# Patient Record
Sex: FEMALE | Race: WHITE
[De-identification: names, ages, dates, MRNs, and addresses within clinical notes are randomized per-mention and may not be internally consistent; named-entity substitution may affect disease eponyms.]

---

## 2017-04-28 ENCOUNTER — HOSPITAL ENCOUNTER (OUTPATIENT)
Dept: HOSPITAL 56 - MW.CHOBGYN | Age: 27
End: 2017-04-28
Attending: OBSTETRICS & GYNECOLOGY
Payer: COMMERCIAL

## 2017-04-28 DIAGNOSIS — Z32.00: Primary | ICD-10-CM

## 2019-06-17 ENCOUNTER — HOSPITAL ENCOUNTER (INPATIENT)
Dept: HOSPITAL 56 - MW.OB | Age: 29
LOS: 2 days | Discharge: HOME | DRG: 560 | End: 2019-06-19
Attending: OBSTETRICS & GYNECOLOGY | Admitting: OBSTETRICS & GYNECOLOGY
Payer: COMMERCIAL

## 2019-06-17 DIAGNOSIS — Z3A.39: ICD-10-CM

## 2019-06-17 NOTE — PCM.PREANE
Preanesthetic Assessment





- Procedure


Proposed Procedure: 





labor epidural





- Anesthesia/Transfusion/Family Hx


Anesthesia History: Prior Anesthesia Without Reaction


Family History of Anesthesia Reaction: No


Transfusion History: No Prior Transfusion(s)





- Review of Systems


General: No Symptoms


Pulmonary: No Symptoms


Cardiovascular: No Symptoms


Gastrointestinal: No Symptoms


Neurological: No Symptoms


Other: Reports: None





- Physical Assessment


NPO Status Date: 19


NPO Status Time: 16:00


Height: 1.7 m


Weight: 106.594 kg


ASA Class: 2


Mental Status: Alert & Oriented x3


Airway Class: Mallampati = 1


Dentition: Reports: Normal Dentition


Thyro-Mental Finger Breadths: 3


ROM/Head Extension: Full


Lungs: Clear to Auscultation


Cardiovascular: Regular Rate





- Lab


Values: 





 Laboratory Last Values











WBC  13.10 K/uL (4.0-11.0)  H  19  19:33    


 


RBC  4.35 M/uL (4.30-5.90)   19  19:33    


 


Hgb  13.4 g/dL (12.0-16.0)   19  19:33    


 


Hct  40.5 % (36.0-46.0)   19  19:33    


 


MCV  93.1 fL (80.0-98.0)   19  19:33    


 


MCH  30.8 pg (27.0-32.0)   19  19:33    


 


MCHC  33.1 g/dL (31.0-37.0)   19  19:33    


 


RDW Std Deviation  46.5 fl (28.0-62.0)   19  19:33    


 


RDW Coeff of Joseph  14 % (11.0-15.0)   19  19:33    


 


Plt Count  191 K/uL (150-400)   19  19:33    


 


MPV  10.30 fL (7.40-12.00)   19  19:33    


 


Nucleated RBC %  0.0 /100WBC  19  19:33    


 


Nucleated RBCs #  0 K/uL  19  19:33    


 


Blood Type  A POSITIVE   19  19:33    


 


Antibody Screen  NEGATIVE   19  19:33    














- Allergies


Allergies/Adverse Reactions: 


 Allergies











Allergy/AdvReac Type Severity Reaction Status Date / Time


 


No Known Allergies Allergy   Verified 19 18:25














- Blood


Blood Available: No


Product(s) Available: None





- Anesthesia Plan


Pre-Op Medication Ordered: None





- Acknowledgements


Anesthesia Type Planned: Epidural


Pt an Appropriate Candidate for the Planned Anesthesia: Yes


Alternatives and Risks of Anesthesia Discussed w Pt/Guardian: Yes


Pt/Guardian Understands and Agrees with Anesthesia Plan: Yes





PreAnesthesia Questionnaire





- Past Health History


Medical/Surgical History: Denies Medical/Surgical History


Cardiovascular History: Reports: None


Respiratory History: Reports: None


Gastrointestinal History: Reports: None


OB/GYN History: Reports: Pregnancy


: 3


Para: 1


LMP (Approximate): Pregnant





- Past Surgical History


HEENT Surgical History: Reports: None


Cardiovascular Surgical History: Reports: None


Respiratory Surgical History: Reports: None


GI Surgical History: Reports: Cholecystectomy


Female  Surgical History: Reports: None


Endocrine Surgical History: Reports: None


Neurological Surgical History: Reports: None


Musculoskeletal Surgical History: Reports: None


Oncologic Surgical History: Reports: None


Dermatological Surgical History: Reports: None





- HOME MEDS


Home Medications: 


 Home Meds





. [No Known Home Meds]  16 [History]











- CURRENT (IN HOUSE) MEDS


Current Meds: 





 Current Medications





Butorphanol Tartrate (Stadol)  1 mg IVPUSH Q1H PRN


   PRN Reason: Pain


Carboprost Tromethamine (Hemabate Ds)  250 mcg IM ASDIRECTED PRN


   PRN Reason: Post Partum Hemorrhage


Tranexamic Acid 1,000 mg/ (Sodium Chloride)  110 mls @ 660 mls/hr IV ONETIME PRN


   PRN Reason: Bleeding


Lactated Ringer's (Ringers, Lactated)  1,000 mls @ 150 mls/hr IV ASDIRECTED [facility]


   Last Admin: 19 21:04 Dose:  999 mls/hr


Oxytocin/Sodium Chloride (Oxytocin 30 Unit/500 Ml-Ns)  30 unit in 500 mls @ 999 

mls/hr IV TITRATE [facility]


Lidocaine HCl (Xylocaine 1%)  50 ml INJECT ONETIME PRN


   PRN Reason: Laceration repair


Methylergonovine Maleate (Methergine)  0.2 mg IM ASDIRECTED PRN


   PRN Reason: Post Partum Hemorrhage


Misoprostol (Cytotec)  200 mcg PO ONETIME PRN


   PRN Reason: Post Partum Hemorrhage


Nalbuphine HCl (Nubain)  10 mg IVPUSH Q1H PRN


   PRN Reason: Pain (severe 7-10)


Sodium Chloride (Saline Flush)  10 ml FLUSH ASDIRECTED PRN


   PRN Reason: Keep Vein Open


Sodium Chloride (Saline Flush)  2.5 ml FLUSH ASDIRECTED PRN


   PRN Reason: Keep Vein Open


Sodium Chloride (Normal Saline)  10 ml IV ASDIRECTED PRN


   PRN Reason: IV Use


Sterile Water (Sterile Water For Irrigation)  1,000 ml IRR ASDIRECTED PRN


   PRN Reason: delivery





Discontinued Medications





Bupivacaine HCl (Sensorcaine-Mpf 0.25%) Confirm Administered Dose 10 ml .ROUTE 

.STK-MED ONE


   Stop: 19 19:53


Ephedrine Sulfate (Ephedrine Sulfate) Confirm Administered Dose 50 mg .ROUTE 

.STK-MED ONE


   Stop: 19 19:53


Fentanyl (Sublimaze) Confirm Administered Dose 100 mcg .ROUTE .STK-MED ONE


   Stop: 19 20:42


Fentanyl/Bupivacaine HCl (Fentanyl-Bupiv-Ns 2 Mcg/Ml-0.125%) Confirm 

Administered Dose 100 mls @ as directed .ROUTE .STK-MED ONE


   Stop: 19 19:52


Lidocaine/Epinephrine (Lidocaine 1.5%-Epi 1:200,000) Confirm Administered Dose 

5 ml IJ .STK-MED ONE


   Stop: 19 19:53

## 2019-06-17 NOTE — PCM.LDHP
L&D History of Present Illness





- General


Date of Service: 19


Admit Problem/Dx: 


 Patient Status Order with Admit Dx/Problem





19 16:07


Patient Status [ADT] Routine 





19 19:06


Patient Status [ADT] Routine 








 Admission Diagnosis/Problem











Admission Diagnosis/Problem    Pregnancy














19 19:43


27yo  EDC 2019 39 4/7wks A+, RI, GBS neg. Active labor


Source of Information: Patient


History Limitations: Reports: No Limitations





- History of Present Illness


Timing/Duration: Reports: minutes:


Location, Pregnancy: Reports: Abdomen


Quality: Reports: Ache, Stabbing


Severity: Moderate


Improves with: Reports: None


Worsens with: Reports: None


Associated Symptoms: Reports: N





- Related Data


Allergies/Adverse Reactions: 


 Allergies











Allergy/AdvReac Type Severity Reaction Status Date / Time


 


No Known Allergies Allergy   Verified 19 18:25











Home Medications: 


 Home Meds





. [No Known Home Meds]  16 [History]











Past Medical History


HEENT History: Reports: None


Cardiovascular History: Reports: None


Genitourinary History: Reports: None


OB/GYN History: Reports: Pregnancy


Musculoskeletal History: Reports: None


Neurological History: Reports: None


Psychiatric History: Reports: None


Endocrine/Metabolic History: Reports: None


Hematologic History: Reports: None


Immunologic History: Reports: None


Oncologic (Cancer) History: Reports: None


Dermatologic History: Reports: None





- Infectious Disease History


Infectious Disease History: Reports: None





- Past Surgical History


Head Surgeries/Procedures: Reports: None


HEENT Surgical History: Reports: None


Cardiovascular Surgical History: Reports: None


Respiratory Surgical History: Reports: None


GI Surgical History: Reports: Cholecystectomy


Female  Surgical History: Reports: None


Neurological Surgical History: Reports: None


Musculoskeletal Surgical History: Reports: None


Oncologic Surgical History: Reports: None





Social & Family History





- Family History


HEENT: Reports: None


Cardiac: Reports: None


GI: Reports: Pancreatitis


OBGYN: Reports: Pregnancy





H&P Review of Systems





- Review of Systems:


Review Of Systems: See Below


General: Reports: No Symptoms


HEENT: Reports: No Symptoms


Pulmonary: Reports: No Symptoms


Cardiovascular: Reports: No Symptoms


Gastrointestinal: Reports: No Symptoms


Genitourinary: Reports: No Symptoms


Musculoskeletal: Reports: No Symptoms


Skin: Reports: No Symptoms


Psychiatric: Reports: No Symptoms


Neurological: Reports: No Symptoms


Hematologic/Lymphatic: Reports: No Symptoms


Immunologic: Reports: No Symptoms





L&D Exam





- Exam


Exam: See Below





- Vital Signs


Weight: 106.594 kg





- OB Specific


Contraction Intensity: Moderate to Strong


Fetal Movement: Active


Fetal Heart Tones: Present


Fetal Heart Tones per Min: 145


Fetal Heart Rate (FHR) Variability: Moderate (6-25 bmp)


Birth Presentation: Vertex





- Mora Score


Mora Score Cervix Position: Anterior


Mora Score Consistency: Soft


Mora Score Effacement: >80%


Mora Score Dilation: > 5 cm


Mora Score Infant's Station: -2


Mora Score Total: 11





- Exam


General: Alert, Oriented, Cooperative


HEENT: Hearing Intact


Lungs: Normal Respiratory Effort


GI/Abdominal Exam: Soft, Non-Tender


Rectal Exam: Deferred


Genitourinary: Normal external exam, Cervical dilitation.  No: Cervical fluid, 

Vaginal bleeding


Back Exam: Normal Inspection, Full Range of Motion


Extremities: Normal Inspection, Normal Range of Motion, Non-Tender, No Pedal 

Edema


Skin: Warm, Dry, Intact


Neurological: Cranial Nerves Intact, Strength Equal Bilateral, Normal Gait, 

Normal Speech, Normal Tone, Sensation Intact


Psychiatric: Alert, Normal Affect, Normal Mood





- Problem List


(1) Supervision of normal IUP (intrauterine pregnancy) in multigravida


SNOMED Code(s): 027139629, 780608906, 491411540


   ICD Code: Z34.80 - ENCOUNTER FOR SUPRVSN OF NORMAL PREGNANCY, UNSP TRIMESTER

   Status: Acute   Priority: High   Current Visit: Yes   


Qualifiers: 


   Trimester: third trimester   Qualified Code(s): Z34.83 - Encounter for 

supervision of other normal pregnancy, third trimester   


Problem List Initiated/Reviewed/Updated: Yes


Orders Last 24hrs: 


 Active Orders 24 hr











 Category Date Time Status


 


 Patient Status [ADT] Routine ADT  19 19:06 Active


 


 Fetal Heart Tones [RC] CONTINUOUS Care  19 19:06 Active


 


 Fetal Non Stress Test [RC] PER UNIT ROUTINE Care  19 16:07 Active


 


 May Shower [RC] ASDIRECTED Care  19 19:06 Active


 


 Notify Provider [RC] PRN Care  19 19:06 Active


 


 Up ad Maddy [RC] ASDIRECTED Care  19 16:07 Active


 


 Vaginal Exam [RC] Click to Edit Care  19 16:07 Active


 


 Vital Signs [RC] PER UNIT ROUTINE Care  19 16:07 Active


 


 CBC W/O DIFF,HEMOGRAM [HEME] Routine Lab  19 19:33 Received


 


 TYPE AND SCREEN [BBK] Routine Lab  19 19:33 Received


 


 Butorphanol [Stadol] Med  19 19:05 Active





 1 mg IVPUSH Q1H PRN   


 


 Carboprost Tromethamine [Hemabate DS] Med  19 19:05 Active





 250 mcg IM ASDIRECTED PRN   


 


 Lactated Ringers [Ringers, Lactated] 1,000 ml Med  19 19:15 Active





 IV ASDIRECTED   


 


 Lidocaine 1% [Xylocaine 1%] Med  19 19:05 Active





 50 ml INJECT ONETIME PRN   


 


 Methylergonovine [Methergine] Med  19 19:05 Active





 0.2 mg IM ASDIRECTED PRN   


 


 Nalbuphine [Nubain] Med  19 19:05 Active





 10 mg IVPUSH Q1H PRN   


 


 Oxytocin/0.9 % Sodium Chloride [Oxytocin 30 Unit/500 ML Med  19 19:15 

Active





 -NS]   





 30 unit in 500 ml IV TITRATE   


 


 Sodium Chloride 0.9% [Normal Saline] Med  19 19:05 Active





 10 ml IV ASDIRECTED PRN   


 


 Sodium Chloride 0.9% [Saline Flush] Med  19 19:05 Active





 10 ml FLUSH ASDIRECTED PRN   


 


 Sodium Chloride 0.9% [Saline Flush] Med  19 19:05 Active





 2.5 ml FLUSH ASDIRECTED PRN   


 


 Tranexamic Acid [Cyklokapron] 1,000 mg Med  19 19:05 Active





 Sodium Chloride 0.9% [Normal Saline] 100 ml   





 IV ONETIME   


 


 Water For Irrigation,Sterile [Sterile Water for Med  19 19:05 Active





 Irrigation]   





 1,000 ml IRR ASDIRECTED PRN   


 


 miSOPROStol [Cytotec] Med  19 19:05 Active





 200 mcg PO ONETIME PRN   


 


 Fetal Scalp Electrode [WOMSER] Per Unit Routine Oth  19 19:06 Ordered


 


 Peripheral IV Insertion Adult [OM.PC] Routine Oth  19 19:06 Ordered


 


 Resuscitation Status Routine Resus Stat  19 16:07 Ordered








 Medication Orders





Butorphanol Tartrate (Stadol)  1 mg IVPUSH Q1H PRN


   PRN Reason: Pain


Carboprost Tromethamine (Hemabate Ds)  250 mcg IM ASDIRECTED PRN


   PRN Reason: Post Partum Hemorrhage


Tranexamic Acid 1,000 mg/ (Sodium Chloride)  110 mls @ 660 mls/hr IV ONETIME PRN


   PRN Reason: Bleeding


Lactated Ringer's (Ringers, Lactated)  1,000 mls @ 150 mls/hr IV ASDIRECTED [facility]


Oxytocin/Sodium Chloride (Oxytocin 30 Unit/500 Ml-Ns)  30 unit in 500 mls @ 999 

mls/hr IV TITRATE [facility]


Lidocaine HCl (Xylocaine 1%)  50 ml INJECT ONETIME PRN


   PRN Reason: Laceration repair


Methylergonovine Maleate (Methergine)  0.2 mg IM ASDIRECTED PRN


   PRN Reason: Post Partum Hemorrhage


Misoprostol (Cytotec)  200 mcg PO ONETIME PRN


   PRN Reason: Post Partum Hemorrhage


Nalbuphine HCl (Nubain)  10 mg IVPUSH Q1H PRN


   PRN Reason: Pain (severe 7-10)


Sodium Chloride (Saline Flush)  10 ml FLUSH ASDIRECTED PRN


   PRN Reason: Keep Vein Open


Sodium Chloride (Saline Flush)  2.5 ml FLUSH ASDIRECTED PRN


   PRN Reason: Keep Vein Open


Sodium Chloride (Normal Saline)  10 ml IV ASDIRECTED PRN


   PRN Reason: IV Use


Sterile Water (Sterile Water For Irrigation)  1,000 ml IRR ASDIRECTED PRN


   PRN Reason: delivery








Assessment/Plan Comment:: 





Labor


A: 27yo  EDC 2019 39 4/7wks A+, RI, GBS neg. Active labor


P: Admit, epidural, anticipate , Dr liz updated

## 2019-06-18 PROCEDURE — 3E0R3BZ INTRODUCTION OF ANESTHETIC AGENT INTO SPINAL CANAL, PERCUTANEOUS APPROACH: ICD-10-PCS | Performed by: OBSTETRICS & GYNECOLOGY

## 2019-06-18 NOTE — PCM.SN
- Free Text/Narrative


Note: 


Called by HUSAM Gonsales RN, because the patient's headache has gotten worse. The 

patient has requested and epidural blood patch.  Risks and benefits discussed, 

with  special emphasis on back pain after the injection of blood.   The patient 

understands and agrees to proceed.  All questions answered.  The patient was 

monitored by pulse oximetry.  Leonard and CRISTAL both wore masks and sterile gloves, 

during the procedure.  The epidural was done with an aseptic technique.   The 

patient was in the sitting position.  Time out performed.  The patient's back 

was prepped with povidone-iodine.  1% lidocaine was used for skin infiltration 

at the same level were the puncture wounds are located.  Touhy needle with tana 

with air.  loss of resistance at 6 cm.  no blood or csf at this level.  Patient 

was a difficult IV stick per RN, and 3 attempts were made to get blood with an 

aseptic technique-patient's right and left lower extremities were prepped with 

povidone iodine for the blood patch.  Ultrasound guided peripheral IV Insertion 

was done by Leonard HUFF, using an aseptic technique.  The patient's arm was re-

prepped with povidone iodine prep, and the 20 gauge IV was  placed, and 20 ml 

of blood was aspirated, and then injected into the patient's back.  The patient 

stated that her headache is "pretty much gone", but her back hurts.  oxycodone 

5 mg po given.  





The patient had a 20 gauge iv inserted into her left antecubital vein because 

the IV in her right hand was non functional.

## 2019-06-18 NOTE — PCM.DEL
L & D Note





- General Info


Date of Service: 19


Mother's Due Date: 19





- Delivery Note


Labor: Spontaneous


Delivery Outcome: Livebirth


Infant Delivery Method: Spontaneous Vaginal Delivery-Single


Infant Delivery Mode: Spontaneous


Birth Presentation: Vertex


Nuchal Cord: Present


Anesthesia Type: Epidural


Amniotic Fluid Description: Clear


Episiotomy Type: None


Laceration: None


Placenta: Intact, Spontaneous


Cord: 3 Vessels


Estimated Blood Loss: 200


Resuscitation Needed: No


APGAR Score 1 min: 9


APGAR Score 5 min: 9


Second Stage Interventions: Reports: Pushing, Pulls Own Legs Back


Delivery Comments (Free Text/Narrative):: 





 of viable male, head delivered OP with great pushing, nuchal x1 loose 

reduced over head, shoulders and body followed easily. Spont cry. Infant placed 

on mothers abd with RN at bs.  Delayed cord clamping. Pitocin to IFV. Cord 

clamped and cut by FOB. Cord blood collected. Placenta delivered grossly 

intact. Inspection noted intact perineum. Mother and baby left in stable 

condition for recovery.





- General Info


Date of Service: 19


Admission Dx/Problem (Free Text): 


 Patient Status Order with Admit Dx/Problem





19 16:07


Patient Status [ADT] Routine 





19 19:06


Patient Status [ADT] Routine 








 Admission Diagnosis/Problem











Admission Diagnosis/Problem    Pregnancy














19 19:43


27yo  EDC 2019 39 4/7wks A+, RI, GBS neg. Active labor


Functional Status: Reports: Pain Controlled, Tolerating Diet





- Review of Systems


General: Reports: No Symptoms


HEENT: Reports: No Symptoms


Pulmonary: Reports: No Symptoms


Cardiovascular: Reports: No Symptoms


Gastrointestinal: Reports: No Symptoms


Genitourinary: Reports: No Symptoms


Musculoskeletal: Reports: No Symptoms


Skin: Reports: No Symptoms


Neurological: Reports: No Symptoms


Psychiatric: Reports: No Symptoms





- Patient Data


Weight - Most Recent: 106.594 kg


Lab Results Last 24 Hours: 


 Laboratory Results - last 24 hr











  19 Range/Units





  19:33 19:33 


 


WBC  13.10 H   (4.0-11.0)  K/uL


 


RBC  4.35   (4.30-5.90)  M/uL


 


Hgb  13.4   (12.0-16.0)  g/dL


 


Hct  40.5   (36.0-46.0)  %


 


MCV  93.1   (80.0-98.0)  fL


 


MCH  30.8   (27.0-32.0)  pg


 


MCHC  33.1   (31.0-37.0)  g/dL


 


RDW Std Deviation  46.5   (28.0-62.0)  fl


 


RDW Coeff of Joseph  14   (11.0-15.0)  %


 


Plt Count  191   (150-400)  K/uL


 


MPV  10.30   (7.40-12.00)  fL


 


Nucleated RBC %  0.0   /100WBC


 


Nucleated RBCs #  0   K/uL


 


Blood Type   A POSITIVE  


 


Antibody Screen   NEGATIVE  











Med Orders - Current: 


 Current Medications





Butorphanol Tartrate (Stadol)  1 mg IVPUSH Q1H PRN


   PRN Reason: Pain


Carboprost Tromethamine (Hemabate Ds)  250 mcg IM ASDIRECTED PRN


   PRN Reason: Post Partum Hemorrhage


Tranexamic Acid 1,000 mg/ (Sodium Chloride)  110 mls @ 660 mls/hr IV ONETIME PRN


   PRN Reason: Bleeding


Lactated Ringer's (Ringers, Lactated)  1,000 mls @ 150 mls/hr IV ASDIRECTED [facility]


   Last Admin: 19 21:04 Dose:  999 mls/hr


Oxytocin/Sodium Chloride (Oxytocin 30 Unit/500 Ml-Ns)  30 unit in 500 mls @ 999 

mls/hr IV TITRATE [facility]


   Last Admin: 19 00:21 Dose:  999 mls/hr


Lidocaine HCl (Xylocaine 1%)  50 ml INJECT ONETIME PRN


   PRN Reason: Laceration repair


Methylergonovine Maleate (Methergine)  0.2 mg IM ASDIRECTED PRN


   PRN Reason: Post Partum Hemorrhage


Misoprostol (Cytotec)  200 mcg PO ONETIME PRN


   PRN Reason: Post Partum Hemorrhage


Nalbuphine HCl (Nubain)  10 mg IVPUSH Q1H PRN


   PRN Reason: Pain (severe 7-10)


Sodium Chloride (Saline Flush)  10 ml FLUSH ASDIRECTED PRN


   PRN Reason: Keep Vein Open


Sodium Chloride (Saline Flush)  2.5 ml FLUSH ASDIRECTED PRN


   PRN Reason: Keep Vein Open


Sodium Chloride (Normal Saline)  10 ml IV ASDIRECTED PRN


   PRN Reason: IV Use


Sterile Water (Sterile Water For Irrigation)  1,000 ml IRR ASDIRECTED PRN


   PRN Reason: delivery





Discontinued Medications





Bupivacaine HCl (Sensorcaine-Mpf 0.25%) Confirm Administered Dose 10 ml .ROUTE 

.STK-MED ONE


   Stop: 19 19:53


Ephedrine Sulfate (Ephedrine Sulfate) Confirm Administered Dose 50 mg .ROUTE 

.STK-MED ONE


   Stop: 19 19:53


Fentanyl (Sublimaze) Confirm Administered Dose 100 mcg .ROUTE .STK-MED ONE


   Stop: 19 20:42


Fentanyl/Bupivacaine HCl (Fentanyl-Bupiv-Ns 2 Mcg/Ml-0.125%) Confirm 

Administered Dose 100 mls @ as directed .ROUTE .STK-MED ONE


   Stop: 19 19:52


Lidocaine/Epinephrine (Lidocaine 1.5%-Epi 1:200,000) Confirm Administered Dose 

5 ml IJ .STK-MED ONE


   Stop: 19 19:53











- Exam


General: Alert, Oriented


Lungs: Normal Respiratory Effort


GI/Abdominal Exam: Soft, Non-Tender


 (Female) Exam: Normal External Exam, Normal Bimanual Exam, Vaginal Bleeding


Back Exam: Normal Inspection


Extremities: Normal Inspection, Normal Range of Motion, Non-Tender, No Pedal 

Edema


Skin: Warm, Dry, Intact


Wound/Incisions: Healing Well


Neurological: No New Focal Deficit, Normal Speech, Normal Tone, Strength Equal 

Bilateral


Psy/Mental Status: Alert, Normal Affect, Normal Mood





- Problem List & Annotations


(1) Supervision of normal IUP (intrauterine pregnancy) in multigravida


SNOMED Code(s): 500290562, 700985205, 986161633


   Code(s): Z34.80 - ENCOUNTER FOR SUPRVSN OF NORMAL PREGNANCY, UNSP TRIMESTER 

  Status: Acute   Priority: High   Current Visit: Yes   


Qualifiers: 


   Trimester: third trimester   Qualified Code(s): Z34.83 - Encounter for 

supervision of other normal pregnancy, third trimester   





(2)  (normal spontaneous vaginal delivery)


SNOMED Code(s): 12721232, 242380349


   Code(s): O80 - ENCOUNTER FOR FULL-TERM UNCOMPLICATED DELIVERY   Status: 

Acute   Priority: High   Current Visit: Yes   





- Problem List Review


Problem List Initiated/Reviewed/Updated: Yes





- My Orders


Last 24 Hours: 


My Active Orders





19 16:07


Up ad Maddy [RC] ASDIRECTED 


Vital Signs [RC] PER UNIT ROUTINE 


Resuscitation Status Routine 





19 19:05


Butorphanol [Stadol]   1 mg IVPUSH Q1H PRN 


Carboprost Tromethamine [Hemabate DS]   250 mcg IM ASDIRECTED PRN 


Lidocaine 1% [Xylocaine 1%]   50 ml INJECT ONETIME PRN 


Methylergonovine [Methergine]   0.2 mg IM ASDIRECTED PRN 


Nalbuphine [Nubain]   10 mg IVPUSH Q1H PRN 


Sodium Chloride 0.9% [Normal Saline]   10 ml IV ASDIRECTED PRN 


Sodium Chloride 0.9% [Saline Flush]   10 ml FLUSH ASDIRECTED PRN 


Sodium Chloride 0.9% [Saline Flush]   2.5 ml FLUSH ASDIRECTED PRN 


Tranexamic Acid [Cyklokapron] 1,000 mg   Sodium Chloride 0.9% [Normal Saline] 

100 ml IV ONETIME 


Water For Irrigation,Sterile [Sterile Water for Irrigation]   1,000 ml IRR 

ASDIRECTED PRN 


miSOPROStol [Cytotec]   200 mcg PO ONETIME PRN 





19 19:06


Patient Status [ADT] Routine 


May Shower [RC] ASDIRECTED 


Notify Provider [RC] PRN 


Fetal Scalp Electrode [WOMSER] Per Unit Routine 


Peripheral IV Insertion Adult [OM.PC] Routine 





19 19:15


Lactated Ringers [Ringers, Lactated] 1,000 ml IV ASDIRECTED 


Oxytocin/0.9 % Sodium Chloride [Oxytocin 30 Unit/500 ML-NS] 30 unit in 500 ml 

IV TITRATE 














- Plan


Plan:: 





Labor


A: 27yo  EDC 2019 39 4/7wks A+, RI, GBS neg. Active labor


P: Admit, epidural, anticipate , Dr liz updated





Delivery


A:  of male, APGARS 9/9, Wt: 8lb 10oz, Intact perineum, EBL 200cc. Mother 

and baby stable


P: Routine pp plan of care

## 2019-06-18 NOTE — [id number]
Post Anesthesia Note





- EVALUATION WITHIN 48HRS OF ANESTHETIC


Vital Signs in Normal Range: Yes


Patient Participated in Evaluation: Yes


Respiratory Function Stable: Yes


Airway Patent: Yes


Cardiovascular Function Stable: Yes


Hydration Status Stable: Yes (encouraged pt to drink fluids and caffiene)


Pain Control Satisfactory: Yes (c/o slight headache.  recieving tylenol and 

motrin)


Nausea and Vomiting Control Satisfactory: Yes


Mental Status Recovered: Yes





- COMMENTS/OBSERVATIONS


Free Text/Narrative:: 





denies and back discomfort or pain with delivery.  discussed with pt again 

about chance of spinal headache and symptoms associated with it.  encouraged 

fluids and caffeine to help

## 2019-06-19 VITALS — DIASTOLIC BLOOD PRESSURE: 61 MMHG | SYSTOLIC BLOOD PRESSURE: 131 MMHG

## 2019-06-19 NOTE — [id number]
Post Anesthesia Note





- EVALUATION WITHIN 48HRS OF ANESTHETIC


Vital Signs in Normal Range: Yes


Patient Participated in Evaluation: Yes


Respiratory Function Stable: Yes


Airway Patent: Yes


Cardiovascular Function Stable: Yes


Hydration Status Stable: Yes


Pain Control Satisfactory: Yes


Nausea and Vomiting Control Satisfactory: Yes


Mental Status Recovered: Yes


Resp Rate: 12





- COMMENTS/OBSERVATIONS


Free Text/Narrative:: 


Patient states that her headache is gone, and her back feels much better.  

There were no apparent anesthetic complications at this time.

## 2019-06-19 NOTE — PCM.DCSUM1
**Discharge Summary





- Hospital Course


Free Text/Narrative:: 





Discharge home with infnt. Follow up in 6 weeks for postpartum.


Diagnosis: Stroke: No





- Discharge Data


Discharge Date: 19


Discharge Disposition: Home, Self-Care 01


Condition: Good





- Discharge Diagnosis/Problem(s)


(1) Supervision of normal IUP (intrauterine pregnancy) in multigravida


SNOMED Code(s): 352501819, 068423996, [id number]


   ICD Code: Z34.80 - ENCOUNTER FOR SUPRVSN OF NORMAL PREGNANCY, UNSP TRIMESTER

   Status: Acute   Priority: High   Current Visit: Yes   


Qualifiers: 


   Trimester: third trimester   Qualified Code(s): Z34.83 - Encounter for 

supervision of other normal pregnancy, third trimester   





(2)  (normal spontaneous vaginal delivery)


SNOMED Code(s): 06039802, 178396890


   ICD Code: O80 - ENCOUNTER FOR FULL-TERM UNCOMPLICATED DELIVERY   Status: 

Acute   Priority: High   Current Visit: Yes   





- Patient Instructions


Diet: Usual Diet as Tolerated


Activity: As Tolerated, No Strenuous Activities, Rest and Relax Today


Driving: May Drive Today


Showering/Bathing: May Shower


Notify Provider of: Fever, Increased Pain, Swelling and Redness, Nausea and/or 

Vomiting


Other/Special Instructions: Discharge home with infnt. Follow up in 6 weeks for 

postpartum.





- Discharge Plan


*PRESCRIPTION DRUG MONITORING PROGRAM REVIEWED*: Not Applicable


*COPY OF PRESCRIPTION DRUG MONITORING REPORT IN PATIENT GABRIEL: Not Applicable


Prescriptions/Med Rec: 


Ibuprofen [Motrin] 800 mg PO Q6H PRN #90 tablet


 PRN Reason: Pain


Home Medications: 


 Home Meds





Ibuprofen [Motrin] 800 mg PO Q6H PRN #90 tablet 19 [Rx]








Oxygen Therapy Mode: Room Air


Referrals: 


[facility] [Outside]


Luz Fuller CNM [Mid-Wife] - 19 10:45 am


()





- Discharge Summary/Plan Comment


DC Time >30 min.: Yes





- General Info


Date of Service: 19


Functional Status: Reports: Pain Controlled, Tolerating Diet, Ambulating, 

Urinating





- Review of Systems


General: Reports: No Symptoms


HEENT: Reports: No Symptoms


Pulmonary: Reports: No Symptoms


Cardiovascular: Reports: No Symptoms


Gastrointestinal: Reports: No Symptoms


Genitourinary: Reports: No Symptoms


Musculoskeletal: Reports: No Symptoms


Skin: Reports: No Symptoms


Neurological: Reports: No Symptoms


Psychiatric: Reports: No Symptoms





- Patient Data


Vitals - Most Recent: 


 Last Vital Signs











Temp  36.5 C   19 07:35


 


Pulse  68   19 07:35


 


Resp  17   19 07:35


 


BP  131/61   19 07:35


 


Pulse Ox  96   19 07:35











Weight - Most Recent: 106.594 kg


Med Orders - Current: 


 Current Medications





Acetaminophen (Tylenol Extra Strength)  500 mg PO Q4H PRN


   PRN Reason: Pain


Acetaminophen (Tylenol Extra Strength)  1,000 mg PO Q4H PRN


   PRN Reason: Pain


   Last Admin: 19 00:05 Dose:  1,000 mg


Benzocaine/Menthol (Dermoplast Pain Relief 20%-0.5% Spray)  78 gm TOP 

ASDIRECTED PRN


   PRN Reason: Perineal Comfort Measure


Bisacodyl (Dulcolax)  10 mg RECTAL ONETIME PRN


   PRN Reason: Constipation


Docusate Sodium (Colace)  100 mg PO BID PRN


   PRN Reason: Constipation


Emollient Ointment (Lansinoh Hpa)  0 gm TOP ASDIRECTED PRN


   PRN Reason: Sore Nipples


Ibuprofen (Motrin)  400 mg PO Q4H PRN


   PRN Reason: Pain


Ibuprofen (Motrin)  800 mg PO Q6H PRN


   PRN Reason: Pain


   Last Admin: 19 04:27 Dose:  800 mg


Oxycodone HCl (Oxycodone)  5 mg PO Q2H PRN


   PRN Reason: Pain


   Last Admin: 19 20:56 Dose:  5 mg


Witch Hazel (Tucks)  1 pad TOP ASDIRECTED PRN


   PRN Reason: comfort care





Discontinued Medications





Bupivacaine HCl (Sensorcaine-Mpf 0.25%) Confirm Administered Dose 10 ml .ROUTE 

.STK-MED ONE


   Stop: 19 00:52


   Last Admin: 19 11:58 Dose:  Not Given


Butorphanol Tartrate (Stadol)  1 mg IVPUSH Q1H PRN


   PRN Reason: Pain


Carboprost Tromethamine (Hemabate Ds)  250 mcg IM ASDIRECTED PRN


   PRN Reason: Post Partum Hemorrhage


Ephedrine Sulfate (Ephedrine Sulfate) Confirm Administered Dose 50 mg .ROUTE 

.STK-MED ONE


   Stop: 19 19:53


   Last Admin: 19 12:01 Dose:  Not Given


Fentanyl (Sublimaze) Confirm Administered Dose 100 mcg .ROUTE .[employer]-MED ONE


   Stop: 19 20:42


   Last Admin: 19 12:01 Dose:  Not Given


Tranexamic Acid 1,000 mg/ (Sodium Chloride)  110 mls @ 660 mls/hr IV ONETIME PRN


   PRN Reason: Bleeding


Lactated Ringer's (Ringers, Lactated)  1,000 mls @ 150 mls/hr IV ASDIRECTED [facility]


   Last Admin: 19 23:11 Dose:  999 mls/hr


Oxytocin/Sodium Chloride (Oxytocin 30 Unit/500 Ml-Ns)  30 unit in 500 mls @ 999 

mls/hr IV TITRATE [facility]


   Last Admin: 19 00:21 Dose:  999 mls/hr


Fentanyl/Bupivacaine HCl (Fentanyl-Bupiv-Ns 2 Mcg/Ml-0.125%) Confirm 

Administered Dose 100 mls @ as directed .ROUTE .[employer]-MED ONE


   Stop: 19 19:52


   Last Admin: 19 12:00 Dose:  Not Given


Lidocaine HCl (Xylocaine 1%)  50 ml INJECT ONETIME PRN


   PRN Reason: Laceration repair


Lidocaine/Epinephrine (Lidocaine 1.5%-Epi 1:200,000) Confirm Administered Dose 

5 ml IJ .[employer]-MED ONE


   Stop: 19 19:53


   Last Admin: 19 12:01 Dose:  Not Given


Methylergonovine Maleate (Methergine)  0.2 mg IM ASDIRECTED PRN


   PRN Reason: Post Partum Hemorrhage


Misoprostol (Cytotec)  200 mcg PO ONETIME PRN


   PRN Reason: Post Partum Hemorrhage


Nalbuphine HCl (Nubain)  10 mg IVPUSH Q1H PRN


   PRN Reason: Pain (severe 7-10)


Sodium Chloride (Saline Flush)  10 ml FLUSH ASDIRECTED PRN


   PRN Reason: Keep Vein Open


Sodium Chloride (Saline Flush)  2.5 ml FLUSH ASDIRECTED PRN


   PRN Reason: Keep Vein Open


Sodium Chloride (Normal Saline)  10 ml IV ASDIRECTED PRN


   PRN Reason: IV Use


Sterile Water (Sterile Water For Irrigation)  1,000 ml IRR ASDIRECTED PRN


   PRN Reason: delivery











- Exam


General: Reports: Alert, Oriented, Cooperative, No Acute Distress


Lungs: Reports: Normal Respiratory Effort


GI/Abdominal Exam: Soft, Non-Tender


 (Female) Exam: Deferred, Vaginal Bleeding


Rectal (Female) Exam: Deferred


Back Exam: Reports: Full Range of Motion


Extremities: Normal Range of Motion, Non-Tender


Skin: Reports: Warm, Dry, Intact


Neurological: Reports: No New Focal Deficit, Normal Speech, Normal Tone, 

Strength Equal Bilateral


Psy/Mental Status: Reports: Alert, Normal Affect, Normal Mood

## 2019-06-20 ENCOUNTER — HOSPITAL ENCOUNTER (EMERGENCY)
Dept: HOSPITAL 56 - MW.ED | Age: 29
Discharge: HOME | End: 2019-06-20
Payer: COMMERCIAL

## 2019-06-20 VITALS — SYSTOLIC BLOOD PRESSURE: 123 MMHG | DIASTOLIC BLOOD PRESSURE: 72 MMHG

## 2019-06-20 DIAGNOSIS — G97.1: Primary | ICD-10-CM

## 2019-06-20 PROCEDURE — 96374 THER/PROPH/DIAG INJ IV PUSH: CPT

## 2019-06-20 PROCEDURE — 99284 EMERGENCY DEPT VISIT MOD MDM: CPT

## 2019-06-20 PROCEDURE — 96361 HYDRATE IV INFUSION ADD-ON: CPT

## 2019-06-20 NOTE — EDM.PDOC
ED HPI GENERAL MEDICAL PROBLEM





- General


Chief Complaint: Headache


Stated Complaint: SPINAL HEADACHE


Time Seen by Provider: 06/20/19 13:20





- History of Present Illness


INITIAL COMMENTS - FREE TEXT/NARRATIVE: 


HISTORY AND PHYSICAL:





History of present illness:


Patient's 28-year-old female with recent delivery with epidural who developed 

was diagnosed as spinal headache subsequent and had a blood patch with 

improvement she is developed symptoms again with headache and postural changes 

mimicking the prior episode of presumed spinal headache no fever chills 

vomiting or other complaints





Review of systems: 


As per history of present illness and below otherwise all systems reviewed and 

negative.





Past medical history: 


As per history of present illness and as reviewed below otherwise 

noncontributory.





Surgical history: 


As per history of present illness and as reviewed below otherwise 

noncontributory.





Social history: 


No reported history of drug or alcohol abuse.





Family history: 


As per history of present illness and as reviewed below otherwise 

noncontributory.





Physical exam:


HEENT: Atraumatic, normocephalic, pupils reactive, negative for conjunctival 

pallor or scleral icterus, mucous membranes moist, throat clear, neck supple, 

nontender, trachea midline.


Lungs: Clear to auscultation, breath sounds equal bilaterally, chest nontender.


Heart: S1S2, regular, negative for clicks, rubs, or JVD.


Abdomen: Soft, nondistended, nontender. Negative for masses or 

hepatosplenomegaly. Negative for costovertebral tenderness.


Pelvis: Stable nontender.


Genitourinary: Deferred.


Rectal: Deferred.


Extremities: Atraumatic, negative for cords or calf pain. Neurovascular 

unremarkable.


Neuro: Awake, alert, oriented. Cranial nerves II through XII unremarkable. 

Cerebellum unremarkable. Motor and sensory unremarkable throughout. Exam 

nonfocal.





Diagnostics:


None





Therapeutics:


Daily 1 L bolus Toradol 30 mg IV Zofran 4 mg IV





Impression: 


#1 cephalgia status post epidural #2 history of spinal headache





Definitive disposition and diagnosis as appropriate pending reevaluation and 

review of above.








- Related Data


 Allergies











Allergy/AdvReac Type Severity Reaction Status Date / Time


 


No Known Allergies Allergy   Verified 06/17/19 18:25











Home Meds: 


 Home Meds





Ibuprofen [Motrin] 800 mg PO Q6H PRN #90 tablet 06/19/19 [Rx]











Past Medical History





- Past Health History


Medical/Surgical History: Denies Medical/Surgical History


HEENT History: Reports: None


Cardiovascular History: Reports: None


Respiratory History: Reports: None


Gastrointestinal History: Reports: None


Genitourinary History: Reports: None


OB/GYN History: Reports: Pregnancy


Musculoskeletal History: Reports: None


Neurological History: Reports: None


Psychiatric History: Reports: None


Endocrine/Metabolic History: Reports: None


Hematologic History: Reports: None


Immunologic History: Reports: None


Oncologic (Cancer) History: Reports: None


Dermatologic History: Reports: None





- Infectious Disease History


Infectious Disease History: Reports: None





- Past Surgical History


HEENT Surgical History: Reports: None


Cardiovascular Surgical History: Reports: None


Respiratory Surgical History: Reports: None


GI Surgical History: Reports: Cholecystectomy


Female  Surgical History: Reports: None


Endocrine Surgical History: Reports: None


Neurological Surgical History: Reports: None


Musculoskeletal Surgical History: Reports: None


Oncologic Surgical History: Reports: None


Dermatological Surgical History: Reports: None





Social & Family History





- Family History


HEENT: Reports: None


Cardiac: Reports: None


GI: Reports: Pancreatitis


OBGYN: Reports: Pregnancy





- Caffeine Use


Caffeine Use: Reports: Coffee





ED ROS GENERAL





- Review of Systems


Review Of Systems: ROS reveals no pertinent complaints other than HPI.





ED EXAM, GENERAL





- Physical Exam


Exam: See Below (See dictation)





Course





- Vital Signs


Text/Narrative:: 


Anesthesia was consult regarding spinal headache and need for evaluation 

anesthesia presented to ER evaluated patient consented for blood patch 

successfully accomplished with great resolution patient is without headache and 

eager for discharge





Last Recorded V/S: 


 Last Vital Signs











Temp  36.7 C   06/20/19 13:19


 


Pulse  64   06/20/19 13:19


 


Resp  16   06/20/19 13:19


 


BP  129/81   06/20/19 13:19


 


Pulse Ox  96   06/20/19 13:19














- Orders/Labs/Meds


Orders: 


 Active Orders 24 hr











 Category Date Time Status


 


 Overnight Pulse Oximetry [RC] Click to Edit Care  06/20/19 14:18 Inactive


 


 Oxygen Therapy, ED [RC] ASDIRECTED Care  06/20/19 14:17 Active


 


 Pulse Oximetry Continuous Monitoring [OM.PC] Routine Oth  06/20/19 14:17 

Ordered











Meds: 


Medications














Discontinued Medications














Generic Name Dose Route Start Last Admin





  Trade Name Freq  PRN Reason Stop Dose Admin


 


Fentanyl  100 mcg  06/20/19 14:07  





  Sublimaze  IVPUSH  06/20/19 14:08  





  ONETIME ONE   





     





     





     





     


 


Sodium Chloride  1,000 mls @ 999 mls/hr  06/20/19 13:33  06/20/19 13:45





  Normal Saline  IV  06/20/19 14:33  999 mls/hr





  .Bol[employer] ONE   Administration





     





     





     





     


 


Ketorolac Tromethamine  30 mg  06/20/19 13:50  06/20/19 13:56





  Toradol  IVPUSH  06/20/19 13:51  30 mg





  ONETIME ONE   Administration





     





     





     





     


 


Midazolam HCl  2 mg  06/20/19 14:07  





  Versed 1 Mg/Ml  IVPUSH  06/20/19 14:08  





  ONETIME ONE   





     





     





     





     














Departure





- Departure


Time of Disposition: 15:32


Disposition: Home, Self-Care 01


Condition: Good


Clinical Impression: 


 Spinal headache








- Discharge Information


Referrals: 


PCP,None [Primary Care Provider] - 


Forms:  ED Department Discharge


Additional Instructions: 


The following information is given to patients seen in the emergency department 

who are being discharged to home. This information is to outline your options 

for follow-up care. We provide all patients seen in our emergency department 

with a follow-up referral.





The need for follow-up, as well as the timing and circumstances, are variable 

depending upon the specifics of your emergency department visit.





If you don't have a primary care physician on staff, we will provide you with a 

referral. We always advise you to contact your personal physician following an 

emergency department visit to inform them of the circumstance of the visit and 

for follow-up with them and/or the need for any referrals to a consulting 

specialist.





The emergency department will also refer you to a specialist when appropriate. 

This referral assures that you have the opportunity for followup care with a 

specialist. All of these measure are taken in an effort to provide you with 

optimal care, which includes your followup.





Under all circumstances we always encourage you to contact your private 

physician who remains a resource for coordinating  your care. When calling for 

followup care, please make the office aware that this follow-up is from your 

recent emergency room visit. If for any reason you are refused follow-up, 

please contact the [facility] emergency department at [phone] 

and asked to speak to the emergency department charge nurse.














Restricted activity as discussed rest as directed per anesthesia follow-up 

primary medical doctor as needed as discussed and return as needed as discussed

## 2019-06-20 NOTE — PCM.SN
- Free Text/Narrative


Note: 


Patient's headache returned last night.  Risks and benefits of epidural blood 

patch discussed.  Patient agrees to proceed, and has consented.  The patient's 

back was prepped and draped with povidone iodine, 1% lidocaine 3 ml was given, 

same level was attempted by Jose. JONY at 6 cm.  20 ml of blood was drawn by 

the Phlebotomist Angeline.  15 ml of blood was given to the patient.  unable to 

tolerate more, began to feel pain in hips and legs.  Versed 2 mg and Fentanyl 

100 mcg iv push after epidural blood patch.  Monitoring spo2, heart rate, and 

bp continuously.  Patient will remain flat for 90 minutes at 1440.  1445-

pressure and discomfort is starting to fade, and headache is gone as of this 

time.

## 2019-06-20 NOTE — [id number]
Post Anesthesia Note





- EVALUATION WITHIN 48HRS OF ANESTHETIC


Vital Signs in Normal Range: Yes


Patient Participated in Evaluation: Yes


Respiratory Function Stable: Yes


Airway Patent: Yes


Cardiovascular Function Stable: Yes


Hydration Status Stable: Yes


Pain Control Satisfactory: Yes


Nausea and Vomiting Control Satisfactory: Yes


Mental Status Recovered: Yes


Resp Rate: 16





- COMMENTS/OBSERVATIONS


Free Text/Narrative:: 





Patient is 45 minutes post procedure. She was placed in a flat bedrest position 

during this time.





She is placed in a semi fowlers position and reports near complete relief of 

her position headache. 





Post procedure instructions given. This includes extra oral fluids, flat 

bedrest with bathroom priviledges as tolerated.





Limited activity and no heavy lifting for several days. 





She understands these instructions.





Time with patient [id number]





Jose Anderson CRNA

## 2020-06-29 NOTE — EDM.PDOC
ED HPI GENERAL MEDICAL PROBLEM





- General


Chief Complaint: OB/GYN Problem


Stated Complaint: BLEEDING FROM MISCARRIAGE


Time Seen by Provider: 20 14:15





- History of Present Illness


INITIAL COMMENTS - FREE TEXT/NARRATIVE: 





History of present illness:


Patient presents with vaginal bleeding that began yesterday.  She says it is 

been very heavy she saw her OB this morning and they believe she is having a 

miscarriage she was scheduled to have a D&C and a few days but she has been 

bleeding continuously since the appointment and now is feeling lightheaded and 

dizzy.  She is a G4,  L 2 no other medical problems she has had a previous 

 labor with stillbirth at about 20 weeks.  States she is filling 4 pads 

an hour.





Review of systems: 


As per history of present illness and below otherwise all systems reviewed and n

egative.





Past medical history: 


As per history of present illness and as reviewed below otherwise 

noncontributory.





Surgical history: 


As per history of present illness and as reviewed below otherwise 

noncontributory.





Social history: 


No reported history of drug or alcohol abuse.





Family history: 


As per history of present illness and as reviewed below otherwise 

noncontributory.





Physical exam:


HEENT: Atraumatic, normocephalic, pupils reactive, negative for conjunctival 

pallor or scleral icterus, mucous membranes moist, throat clear, neck supple, 

nontender, trachea midline.


Lungs: Clear to auscultation, breath sounds equal bilaterally, chest nontender.


Heart: S1S2, regular, negative for clicks, rubs, or JVD.  Tachycardic 120s


Abdomen: Soft, nondistended, nontender. Negative for masses or 

hepatosplenomegaly. Negative for costovertebral tenderness.


Pelvis: Stable nontender.


Genitourinary: Deferred.


Rectal: Deferred.


Extremities: Atraumatic, negative for cords or calf pain. Neurovascular 

unremarkable.


Neuro: Awake, alert, oriented. Cranial nerves II through XII unremarkable. 

Cerebellum unremarkable. Motor and sensory unremarkable throughout. Exam 

nonfocal.





Diagnostics:


[]





Therapeutics:


[]





Impression: 


[]





Plan: Labs fluid resuscitation check a formal ultrasound reassess the patient


[]





Definitive disposition and diagnosis as appropriate pending reevaluation and 

review of above.





  ** lower abdominal


Pain Score (Numeric/FACES): 5





- Related Data


                                    Allergies











Allergy/AdvReac Type Severity Reaction Status Date / Time


 


No Known Allergies Allergy   Verified 19 18:25











Home Meds: 


                                    Home Meds





. [No Known Home Meds]  20 [History]











Past Medical History





- Past Health History


Medical/Surgical History: Denies Medical/Surgical History


HEENT History: Reports: None


Cardiovascular History: Reports: None


Respiratory History: Reports: None


Gastrointestinal History: Reports: None


Genitourinary History: Reports: None


OB/GYN History: Reports: Pregnancy


Musculoskeletal History: Reports: None


Neurological History: Reports: None


Psychiatric History: Reports: None


Endocrine/Metabolic History: Reports: None


Hematologic History: Reports: None


Immunologic History: Reports: None


Oncologic (Cancer) History: Reports: None


Dermatologic History: Reports: None





- Infectious Disease History


Infectious Disease History: Reports: None





- Past Surgical History


Head Surgeries/Procedures: Reports: None


HEENT Surgical History: Reports: None


Cardiovascular Surgical History: Reports: None


Respiratory Surgical History: Reports: None


GI Surgical History: Reports: Cholecystectomy


Female  Surgical History: Reports: None


Endocrine Surgical History: Reports: None


Neurological Surgical History: Reports: None


Musculoskeletal Surgical History: Reports: None


Oncologic Surgical History: Reports: None


Dermatological Surgical History: Reports: None





Social & Family History





- Family History


HEENT: Reports: None


Cardiac: Reports: None


GI: Reports: Pancreatitis


OBGYN: Reports: Pregnancy





- Tobacco Use


Smoking Status *Q: Never Smoker





- Caffeine Use


Caffeine Use: Reports: Coffee





- Recreational Drug Use


Recreational Drug Use: No





ED ROS GENERAL





- Review of Systems


Review Of Systems: See Below





ED EXAM, GENERAL





- Physical Exam


Exam: See Below





Course





- Vital Signs


Last Recorded V/S: 


                                Last Vital Signs











Temp  35.8 C L  20 14:07


 


Pulse  64   20 15:10


 


Resp  15   20 15:10


 


BP  108/64   20 15:10


 


Pulse Ox  97   20 15:10














- Orders/Labs/Meds


Orders: 


                               Active Orders 24 hr











 Category Date Time Status


 


 CHLAMYDIA AND GONORRHEA BY TMA Stat Lab  20 17:05 Received


 


 TRICH/GERMAINE/CAND BY DNA PROBE [[facility]] Stat Lab  20 17:05 Received











Labs: 


                                Laboratory Tests











  20 Range/Units





  13:37 13:37 13:37 


 


WBC  9.77    (4.0-11.0)  K/uL


 


RBC  4.01 L    (4.30-5.90)  M/uL


 


Hgb  11.9 L    (12.0-16.0)  g/dL


 


Hct  36.7    (36.0-46.0)  %


 


MCV  91.5    (80.0-98.0)  fL


 


MCH  29.7    (27.0-32.0)  pg


 


MCHC  32.4    (31.0-37.0)  g/dL


 


RDW Std Deviation  41.9    (28.0-62.0)  fl


 


RDW Coeff of Joseph  12    (11.0-15.0)  %


 


Plt Count  220    (150-400)  K/uL


 


MPV  10.00    (7.40-12.00)  fL


 


Neut % (Auto)  57.9    (48.0-80.0)  %


 


Lymph % (Auto)  33.5    (16.0-40.0)  %


 


Mono % (Auto)  6.6    (0.0-15.0)  %


 


Eos % (Auto)  1.7    (0.0-7.0)  %


 


Baso % (Auto)  0.3    (0.0-1.5)  %


 


Neut # (Auto)  5.7    (1.4-5.7)  K/uL


 


Lymph # (Auto)  3.3 H    (0.6-2.4)  K/uL


 


Mono # (Auto)  0.6    (0.0-0.8)  K/uL


 


Eos # (Auto)  0.2    (0.0-0.7)  K/uL


 


Baso # (Auto)  0.0    (0.0-0.1)  K/uL


 


Nucleated RBC %  0.0    /100WBC


 


Nucleated RBCs #  0    K/uL


 


Sodium     (136-145)  mmol/L


 


Potassium     (3.5-5.1)  mmol/L


 


Chloride     ()  mmol/L


 


Carbon Dioxide     (21.0-32.0)  mmol/L


 


BUN     (7.0-18.0)  mg/dL


 


Creatinine     (0.6-1.0)  mg/dL


 


Est Cr Clr Drug Dosing     mL/min


 


Estimated GFR (MDRD)     ml/min


 


Glucose     ()  mg/dL


 


Calcium     (8.5-10.1)  mg/dL


 


Total Bilirubin     (0.2-1.0)  mg/dL


 


AST     (15-37)  IU/L


 


ALT     (14-63)  IU/L


 


Alkaline Phosphatase     ()  U/L


 


Total Protein     (6.4-8.2)  g/dL


 


Albumin     (3.4-5.0)  g/dL


 


Globulin     (2.6-4.0)  g/dL


 


Albumin/Globulin Ratio     (0.9-1.6)  


 


HCG, Quant   3152.0   mIU/mL


 


Urine Color     


 


Urine Appearance     


 


Urine pH     (5.0-8.0)  


 


Ur Specific Gravity     (1.001-1.035)  


 


Urine Protein     (NEGATIVE)  mg/dL


 


Urine Glucose (UA)     (NEGATIVE)  mg/dL


 


Urine Ketones     (NEGATIVE)  mg/dL


 


Urine Occult Blood     (NEGATIVE)  


 


Urine Nitrite     (NEGATIVE)  


 


Urine Bilirubin     (NEGATIVE)  


 


Urine Urobilinogen     (<2.0)  EU/dL


 


Ur Leukocyte Esterase     (NEGATIVE)  


 


Urine RBC     (0-2/HPF)  


 


Urine WBC     (0-5/HPF)  


 


Ur Epithelial Cells     (NONE-FEW)  


 


Urine Bacteria     (NEGATIVE)  


 


Blood Type    A POSITIVE  














  20 Range/Units





  13:37 15:56 


 


WBC    (4.0-11.0)  K/uL


 


RBC    (4.30-5.90)  M/uL


 


Hgb    (12.0-16.0)  g/dL


 


Hct    (36.0-46.0)  %


 


MCV    (80.0-98.0)  fL


 


MCH    (27.0-32.0)  pg


 


MCHC    (31.0-37.0)  g/dL


 


RDW Std Deviation    (28.0-62.0)  fl


 


RDW Coeff of Joseph    (11.0-15.0)  %


 


Plt Count    (150-400)  K/uL


 


MPV    (7.40-12.00)  fL


 


Neut % (Auto)    (48.0-80.0)  %


 


Lymph % (Auto)    (16.0-40.0)  %


 


Mono % (Auto)    (0.0-15.0)  %


 


Eos % (Auto)    (0.0-7.0)  %


 


Baso % (Auto)    (0.0-1.5)  %


 


Neut # (Auto)    (1.4-5.7)  K/uL


 


Lymph # (Auto)    (0.6-2.4)  K/uL


 


Mono # (Auto)    (0.0-0.8)  K/uL


 


Eos # (Auto)    (0.0-0.7)  K/uL


 


Baso # (Auto)    (0.0-0.1)  K/uL


 


Nucleated RBC %    /100WBC


 


Nucleated RBCs #    K/uL


 


Sodium  140   (136-145)  mmol/L


 


Potassium  3.5   (3.5-5.1)  mmol/L


 


Chloride  103   ()  mmol/L


 


Carbon Dioxide  26.2   (21.0-32.0)  mmol/L


 


BUN  10   (7.0-18.0)  mg/dL


 


Creatinine  0.6   (0.6-1.0)  mg/dL


 


Est Cr Clr Drug Dosing  136.96   mL/min


 


Estimated GFR (MDRD)  > 60.0   ml/min


 


Glucose  110 H   ()  mg/dL


 


Calcium  8.3 L   (8.5-10.1)  mg/dL


 


Total Bilirubin  0.3   (0.2-1.0)  mg/dL


 


AST  14 L   (15-37)  IU/L


 


ALT  16   (14-63)  IU/L


 


Alkaline Phosphatase  46   ()  U/L


 


Total Protein  6.4   (6.4-8.2)  g/dL


 


Albumin  3.4   (3.4-5.0)  g/dL


 


Globulin  3.0   (2.6-4.0)  g/dL


 


Albumin/Globulin Ratio  1.1   (0.9-1.6)  


 


HCG, Quant    mIU/mL


 


Urine Color   STONEY  


 


Urine Appearance   SLT CLOUDY  


 


Urine pH   6.0  (5.0-8.0)  


 


Ur Specific Gravity   >= 1.030  (1.001-1.035)  


 


Urine Protein   TRACE H  (NEGATIVE)  mg/dL


 


Urine Glucose (UA)   NEGATIVE  (NEGATIVE)  mg/dL


 


Urine Ketones   NEGATIVE  (NEGATIVE)  mg/dL


 


Urine Occult Blood   LARGE H  (NEGATIVE)  


 


Urine Nitrite   NEGATIVE  (NEGATIVE)  


 


Urine Bilirubin   NEGATIVE  (NEGATIVE)  


 


Urine Urobilinogen   0.2  (<2.0)  EU/dL


 


Ur Leukocyte Esterase   TRACE H  (NEGATIVE)  


 


Urine RBC   TOO BRADFORD  (0-2/HPF)  


 


Urine WBC   3-5  (0-5/HPF)  


 


Ur Epithelial Cells   FEW  (NONE-FEW)  


 


Urine Bacteria   FEW  (NEGATIVE)  


 


Blood Type    











Meds: 


Medications














Discontinued Medications














Generic Name Dose Route Start Last Admin





  Trade Name Freq  PRN Reason Stop Dose Admin


 


Sodium Chloride  1,000 mls @ 999 mls/hr  20 14:19  20 14:37





  Normal Saline  IV  20 15:19  999 mls/hr





  .Bolus ONE   Administration


 


Sodium Chloride  1,000 mls @ 999 mls/hr  20 15:57  20 16:14





  Normal Saline  IV  20 16:57  999 mls/hr





  .Bolus ONE   Administration














Departure





- Departure


Time of Disposition: 17:20


Disposition: Home, Self-Care 01


Condition: Good


Clinical Impression: 


 Incomplete 








- Discharge Information


*PRESCRIPTION DRUG MONITORING PROGRAM REVIEWED*: Not Applicable


*COPY OF PRESCRIPTION DRUG MONITORING REPORT IN PATIENT GABRIEL: Not Applicable


Instructions:  Miscarriage, Easy-to-Read


Referrals: 


Esther Azevedo NP [Primary Care Provider] - 


Forms:  ED Department Discharge


Additional Instructions: 


The following information is given to patients seen in the emergency department 

who are being discharged to home. This information is to outline your options 

for follow-up care. We provide all patients seen in our emergency department 

with a follow-up referral.





The need for follow-up, as well as the timing and circumstances, are variable 

depending upon the specifics of your emergency department visit.





If you don't have a primary care physician on staff, we will provide you with a 

referral. We always advise you to contact your personal physician following an 

emergency department visit to inform them of the circumstance of the visit and 

for follow-up with them and/or the need for any referrals to a consulting 

specialist.





The emergency department will also refer you to a specialist when appropriate. 

This referral assures that you have the opportunity for follow-up care with a 

specialist. All of these measure are taken in an effort to provide you with 

optimal care, which includes your follow-up.





Under all circumstances we always encourage you to contact your private 

physician who remains a resource for coordinating your care. When calling for 

follow-up care, please make the office aware that this follow-up is from your 

recent emergency room visit. If for any reason you are refused follow-up, please

contact the [facility] Emergency Department

at [phone] and asked to speak to the emergency department charge nurse.





If you begin to feel worse this evening please return to the ED for recheck.  

Otherwise please keep your appointment with Luz in the morning as scheduled.





Sepsis Event Note (ED)





- Evaluation


Sepsis Screening Result: No Definite Risk





- Focused Exam


Vital Signs: 


                                   Vital Signs











  Temp Pulse Resp BP Pulse Ox


 


 20 15:10   64  15  108/64  97


 


 20 14:48   64  15  100/57 L  97


 


 20 14:07  35.8 C L  125 H  18  120/84  98














- My Orders


Last 24 Hours: 


My Active Orders





20 17:05


CHLAMYDIA AND GONORRHEA BY TMA Stat 


TRICH/GERMAINE/CAND BY DNA PROBE [[facility]] Stat 














- Assessment/Plan


Last 24 Hours: 


My Active Orders





20 17:05


CHLAMYDIA AND GONORRHEA BY TMA Stat 


TRICH/GERMAINE/CAND BY DNA PROBE [[facility]] Stat

## 2020-06-29 NOTE — US
1st trimester obstetrical ultrasound: Multiple real-time images were 

obtained transvaginally.

 

Thickened heterogeneous endometrium is seen.  Endometrial thickness is

 3.1 cm.  Findings most likely represent blood clot and possible 

products of conception.  No myometrial abnormality is seen.  Follicles

 are seen within the ovaries.  No free fluid is seen.

 

Impression:

1.  Thickened endometrium most likely representing blood clot and 

possible products of conception.

2.  No gestational sac or adnexal abnormalities are seen.

 

Diagnostic code #3

 

This report was dictated in MDT